# Patient Record
Sex: MALE | Race: WHITE | NOT HISPANIC OR LATINO
[De-identification: names, ages, dates, MRNs, and addresses within clinical notes are randomized per-mention and may not be internally consistent; named-entity substitution may affect disease eponyms.]

---

## 2017-01-10 ENCOUNTER — APPOINTMENT (OUTPATIENT)
Dept: COLORECTAL SURGERY | Facility: CLINIC | Age: 37
End: 2017-01-10

## 2022-08-23 ENCOUNTER — APPOINTMENT (OUTPATIENT)
Dept: ORTHOPEDIC SURGERY | Facility: CLINIC | Age: 42
End: 2022-08-23

## 2022-08-23 VITALS
SYSTOLIC BLOOD PRESSURE: 127 MMHG | DIASTOLIC BLOOD PRESSURE: 82 MMHG | WEIGHT: 198 LBS | HEIGHT: 66 IN | HEART RATE: 66 BPM | BODY MASS INDEX: 31.82 KG/M2

## 2022-08-23 DIAGNOSIS — M25.562 PAIN IN LEFT KNEE: ICD-10-CM

## 2022-08-23 PROCEDURE — 99203 OFFICE O/P NEW LOW 30 MIN: CPT

## 2022-08-23 PROCEDURE — 73564 X-RAY EXAM KNEE 4 OR MORE: CPT | Mod: LT

## 2022-08-23 NOTE — ADDENDUM
[FreeTextEntry1] : This note was authored by Cecilio Torres working as a medical scribe for Dr. Milo Gonzalez. The note was reviewed, edited, and revised by Dr. Milo Gonzalez whom is in agreement with the exam findings, imaging findings, and treatment plan. 08/23/2022

## 2022-08-23 NOTE — PHYSICAL EXAM
[de-identified] : The patient appears well nourished  and in no apparent distress.  The patient is alert and oriented to person, place, and time.   Affect and mood appear normal. The head is normocephalic and atraumatic.  The eyes reveal normal sclera and extra ocular muscles are intact. The tongue is midline with no apparent lesions.  Skin shows normal turgor with no evidence of eczema or psoriasis.  No respiratory distress noted.  Sensation grossly intact.		  [de-identified] : Exam of the left knee shows full extension. The tibia tubercle is nontender. There is no effusion. He is tender over the inferior pole of the patella. No prepatellar bursitis.\par 5/5 motor strength bilaterally distally. Sensation intact distally.		  [de-identified] : X-ray: 4 views of the left knee demonstrate well preserved joint spaces, questionable nondisplaced fracture inferior pole patella vs bone contusion.
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
